# Patient Record
Sex: FEMALE | Race: WHITE | NOT HISPANIC OR LATINO | URBAN - METROPOLITAN AREA
[De-identification: names, ages, dates, MRNs, and addresses within clinical notes are randomized per-mention and may not be internally consistent; named-entity substitution may affect disease eponyms.]

---

## 2022-10-30 ENCOUNTER — INPATIENT (INPATIENT)
Facility: HOSPITAL | Age: 22
LOS: 0 days | Discharge: HOME | DRG: 916 | End: 2022-10-31
Attending: STUDENT IN AN ORGANIZED HEALTH CARE EDUCATION/TRAINING PROGRAM | Admitting: STUDENT IN AN ORGANIZED HEALTH CARE EDUCATION/TRAINING PROGRAM
Payer: COMMERCIAL

## 2022-10-30 VITALS
DIASTOLIC BLOOD PRESSURE: 62 MMHG | TEMPERATURE: 99 F | SYSTOLIC BLOOD PRESSURE: 126 MMHG | RESPIRATION RATE: 18 BRPM | OXYGEN SATURATION: 90 % | HEART RATE: 148 BPM

## 2022-10-30 DIAGNOSIS — F17.200 NICOTINE DEPENDENCE, UNSPECIFIED, UNCOMPLICATED: ICD-10-CM

## 2022-10-30 DIAGNOSIS — F17.290 NICOTINE DEPENDENCE, OTHER TOBACCO PRODUCT, UNCOMPLICATED: ICD-10-CM

## 2022-10-30 DIAGNOSIS — Z90.89 ACQUIRED ABSENCE OF OTHER ORGANS: Chronic | ICD-10-CM

## 2022-10-30 DIAGNOSIS — T78.2XXA ANAPHYLACTIC SHOCK, UNSPECIFIED, INITIAL ENCOUNTER: ICD-10-CM

## 2022-10-30 LAB
ALBUMIN SERPL ELPH-MCNC: 4.1 G/DL — SIGNIFICANT CHANGE UP (ref 3.4–5)
ALP SERPL-CCNC: 81 U/L — SIGNIFICANT CHANGE UP (ref 40–120)
ALT FLD-CCNC: 20 U/L — SIGNIFICANT CHANGE UP (ref 12–42)
ANION GAP SERPL CALC-SCNC: 8 MMOL/L — LOW (ref 9–16)
APTT BLD: 28 SEC — SIGNIFICANT CHANGE UP (ref 27.5–35.5)
AST SERPL-CCNC: 16 U/L — SIGNIFICANT CHANGE UP (ref 15–37)
BASOPHILS # BLD AUTO: 0.01 K/UL — SIGNIFICANT CHANGE UP (ref 0–0.2)
BASOPHILS NFR BLD AUTO: 0.1 % — SIGNIFICANT CHANGE UP (ref 0–2)
BILIRUB SERPL-MCNC: 0.7 MG/DL — SIGNIFICANT CHANGE UP (ref 0.2–1.2)
BUN SERPL-MCNC: 16 MG/DL — SIGNIFICANT CHANGE UP (ref 7–23)
CALCIUM SERPL-MCNC: 8.4 MG/DL — LOW (ref 8.5–10.5)
CHLORIDE SERPL-SCNC: 106 MMOL/L — SIGNIFICANT CHANGE UP (ref 96–108)
CO2 SERPL-SCNC: 23 MMOL/L — SIGNIFICANT CHANGE UP (ref 22–31)
CREAT SERPL-MCNC: 0.79 MG/DL — SIGNIFICANT CHANGE UP (ref 0.5–1.3)
EGFR: 110 ML/MIN/1.73M2 — SIGNIFICANT CHANGE UP
EOSINOPHIL # BLD AUTO: 0.11 K/UL — SIGNIFICANT CHANGE UP (ref 0–0.5)
EOSINOPHIL NFR BLD AUTO: 1 % — SIGNIFICANT CHANGE UP (ref 0–6)
GLUCOSE SERPL-MCNC: 205 MG/DL — HIGH (ref 70–99)
HCG SERPL-ACNC: <1 MIU/ML — SIGNIFICANT CHANGE UP
HCT VFR BLD CALC: 38.6 % — SIGNIFICANT CHANGE UP (ref 34.5–45)
HGB BLD-MCNC: 13.9 G/DL — SIGNIFICANT CHANGE UP (ref 11.5–15.5)
HIV 1+2 AB+HIV1 P24 AG SERPL QL IA: SIGNIFICANT CHANGE UP
IMM GRANULOCYTES NFR BLD AUTO: 0.4 % — SIGNIFICANT CHANGE UP (ref 0–0.9)
INR BLD: 1.32 — HIGH (ref 0.88–1.16)
LYMPHOCYTES # BLD AUTO: 44.7 % — HIGH (ref 13–44)
LYMPHOCYTES # BLD AUTO: 5.16 K/UL — HIGH (ref 1–3.3)
MANUAL SMEAR VERIFICATION: SIGNIFICANT CHANGE UP
MCHC RBC-ENTMCNC: 33.1 PG — SIGNIFICANT CHANGE UP (ref 27–34)
MCHC RBC-ENTMCNC: 36 GM/DL — SIGNIFICANT CHANGE UP (ref 32–36)
MCV RBC AUTO: 91.9 FL — SIGNIFICANT CHANGE UP (ref 80–100)
MONOCYTES # BLD AUTO: 0.89 K/UL — SIGNIFICANT CHANGE UP (ref 0–0.9)
MONOCYTES NFR BLD AUTO: 7.7 % — SIGNIFICANT CHANGE UP (ref 2–14)
NEUTROPHILS # BLD AUTO: 5.33 K/UL — SIGNIFICANT CHANGE UP (ref 1.8–7.4)
NEUTROPHILS NFR BLD AUTO: 46.1 % — SIGNIFICANT CHANGE UP (ref 43–77)
NRBC # BLD: 0 /100 WBCS — SIGNIFICANT CHANGE UP (ref 0–0)
PLAT MORPH BLD: NORMAL — SIGNIFICANT CHANGE UP
PLATELET # BLD AUTO: 366 K/UL — SIGNIFICANT CHANGE UP (ref 150–400)
PLATELET COUNT - ESTIMATE: NORMAL — SIGNIFICANT CHANGE UP
POTASSIUM SERPL-MCNC: 3.3 MMOL/L — LOW (ref 3.5–5.3)
POTASSIUM SERPL-SCNC: 3.3 MMOL/L — LOW (ref 3.5–5.3)
PROT SERPL-MCNC: 6.8 G/DL — SIGNIFICANT CHANGE UP (ref 6.4–8.2)
PROTHROM AB SERPL-ACNC: 15.5 SEC — HIGH (ref 10.5–13.4)
RBC # BLD: 4.2 M/UL — SIGNIFICANT CHANGE UP (ref 3.8–5.2)
RBC # FLD: 12.1 % — SIGNIFICANT CHANGE UP (ref 10.3–14.5)
RBC BLD AUTO: NORMAL — SIGNIFICANT CHANGE UP
SARS-COV-2 RNA SPEC QL NAA+PROBE: SIGNIFICANT CHANGE UP
SODIUM SERPL-SCNC: 137 MMOL/L — SIGNIFICANT CHANGE UP (ref 132–145)
TROPONIN I, HIGH SENSITIVITY RESULT: 6.5 NG/L — SIGNIFICANT CHANGE UP
WBC # BLD: 11.55 K/UL — HIGH (ref 3.8–10.5)
WBC # FLD AUTO: 11.55 K/UL — HIGH (ref 3.8–10.5)

## 2022-10-30 PROCEDURE — 99291 CRITICAL CARE FIRST HOUR: CPT

## 2022-10-30 PROCEDURE — 93010 ELECTROCARDIOGRAM REPORT: CPT

## 2022-10-30 PROCEDURE — 99291 CRITICAL CARE FIRST HOUR: CPT | Mod: GC

## 2022-10-30 RX ORDER — EPINEPHRINE 11.25MG/ML
0.5 SOLUTION, NON-ORAL INHALATION ONCE
Refills: 0 | Status: COMPLETED | OUTPATIENT
Start: 2022-10-30 | End: 2022-10-30

## 2022-10-30 RX ORDER — POTASSIUM CHLORIDE 20 MEQ
40 PACKET (EA) ORAL ONCE
Refills: 0 | Status: DISCONTINUED | OUTPATIENT
Start: 2022-10-30 | End: 2022-10-30

## 2022-10-30 RX ORDER — EPINEPHRINE 0.3 MG/.3ML
0.3 INJECTION INTRAMUSCULAR; SUBCUTANEOUS ONCE
Refills: 0 | Status: COMPLETED | OUTPATIENT
Start: 2022-10-30 | End: 2022-10-30

## 2022-10-30 RX ORDER — CHLORHEXIDINE GLUCONATE 213 G/1000ML
1 SOLUTION TOPICAL
Refills: 0 | Status: DISCONTINUED | OUTPATIENT
Start: 2022-10-30 | End: 2022-10-31

## 2022-10-30 RX ORDER — FAMOTIDINE 10 MG/ML
20 INJECTION INTRAVENOUS ONCE
Refills: 0 | Status: COMPLETED | OUTPATIENT
Start: 2022-10-30 | End: 2022-10-30

## 2022-10-30 RX ORDER — SODIUM CHLORIDE 9 MG/ML
1000 INJECTION INTRAMUSCULAR; INTRAVENOUS; SUBCUTANEOUS ONCE
Refills: 0 | Status: COMPLETED | OUTPATIENT
Start: 2022-10-30 | End: 2022-10-30

## 2022-10-30 RX ORDER — NICOTINE POLACRILEX 2 MG
1 GUM BUCCAL DAILY
Refills: 0 | Status: DISCONTINUED | OUTPATIENT
Start: 2022-10-30 | End: 2022-10-31

## 2022-10-30 RX ORDER — IPRATROPIUM/ALBUTEROL SULFATE 18-103MCG
3 AEROSOL WITH ADAPTER (GRAM) INHALATION ONCE
Refills: 0 | Status: COMPLETED | OUTPATIENT
Start: 2022-10-30 | End: 2022-10-30

## 2022-10-30 RX ORDER — SODIUM CHLORIDE 9 MG/ML
1000 INJECTION, SOLUTION INTRAVENOUS
Refills: 0 | Status: DISCONTINUED | OUTPATIENT
Start: 2022-10-30 | End: 2022-10-31

## 2022-10-30 RX ORDER — POTASSIUM CHLORIDE 20 MEQ
40 PACKET (EA) ORAL ONCE
Refills: 0 | Status: COMPLETED | OUTPATIENT
Start: 2022-10-30 | End: 2022-10-31

## 2022-10-30 RX ORDER — POTASSIUM CHLORIDE 20 MEQ
40 PACKET (EA) ORAL ONCE
Refills: 0 | Status: COMPLETED | OUTPATIENT
Start: 2022-10-30 | End: 2022-10-30

## 2022-10-30 RX ORDER — ALBUTEROL 90 UG/1
2 AEROSOL, METERED ORAL EVERY 6 HOURS
Refills: 0 | Status: DISCONTINUED | OUTPATIENT
Start: 2022-10-30 | End: 2022-10-31

## 2022-10-30 RX ORDER — FAMOTIDINE 10 MG/ML
20 INJECTION INTRAVENOUS DAILY
Refills: 0 | Status: DISCONTINUED | OUTPATIENT
Start: 2022-10-30 | End: 2022-10-31

## 2022-10-30 RX ORDER — EPINEPHRINE 0.3 MG/.3ML
0.1 INJECTION INTRAMUSCULAR; SUBCUTANEOUS
Qty: 4 | Refills: 0 | Status: DISCONTINUED | OUTPATIENT
Start: 2022-10-30 | End: 2022-10-31

## 2022-10-30 RX ADMIN — SODIUM CHLORIDE 1000 MILLILITER(S): 9 INJECTION INTRAMUSCULAR; INTRAVENOUS; SUBCUTANEOUS at 16:14

## 2022-10-30 RX ADMIN — Medication 125 MILLIGRAM(S): at 16:12

## 2022-10-30 RX ADMIN — SODIUM CHLORIDE 75 MILLILITER(S): 9 INJECTION, SOLUTION INTRAVENOUS at 17:57

## 2022-10-30 RX ADMIN — EPINEPHRINE 21.4 MICROGRAM(S)/KG/MIN: 0.3 INJECTION INTRAMUSCULAR; SUBCUTANEOUS at 17:57

## 2022-10-30 RX ADMIN — Medication 0.5 MILLILITER(S): at 16:28

## 2022-10-30 RX ADMIN — FAMOTIDINE 20 MILLIGRAM(S): 10 INJECTION INTRAVENOUS at 16:13

## 2022-10-30 RX ADMIN — Medication 3 MILLILITER(S): at 16:14

## 2022-10-30 RX ADMIN — Medication 40 MILLIEQUIVALENT(S): at 21:55

## 2022-10-30 RX ADMIN — EPINEPHRINE 0.3 MILLIGRAM(S): 0.3 INJECTION INTRAMUSCULAR; SUBCUTANEOUS at 16:06

## 2022-10-30 NOTE — ED PROVIDER NOTE - PHYSICAL EXAMINATION
VITAL SIGNS: I have reviewed nursing notes and confirm.  CONSTITUTIONAL: Appears diffusely red.  Drowsy but arouses to voice and fully oriented.    SKIN: Diffuse urticaria.   HEAD: Normocephalic; atraumatic.  EYES: PERRL, EOM intact; conjunctiva and sclera clear.  ENT: +Edema to posterior pharynx/uvula.  Tolerating secretions.  Normal voice.  Able to fully open mouth.    NECK: Supple; non tender.  CARD: S1, S2 normal; no murmurs, gallops, or rubs. +Regular tachycardia.   RESP: Diffuse expiratory wheezing.    ABD: Normal bowel sounds; soft; non-distended; non-tender; no hepatosplenomegaly.  MSK: Normal ROM. No clubbing, cyanosis or edema.  NEURO: Alert, oriented. Grossly unremarkable.  PSYCH: Cooperative, appropriate.

## 2022-10-30 NOTE — ED ADULT TRIAGE NOTE - CHIEF COMPLAINT QUOTE
Pt BIBEMS for allergic reaction after eating a chocolate bar. Pt with known allergies to nuts. Pt states that she took her own epi pen about 1 hour ago. Pt gave a second .3 epi IM, 50mg of benadryl, and 12mg of dexamethasone and one combi vent by EMS. Pt still with trouble breathing. + redness Pt BIBEMS for allergic reaction after eating a chocolate bar. Pt with known allergies to nuts. Pt states that she took her own epi pen about 1 hour ago and then went to [facility]  [facility] pt received 0.3 epi IM, 50mg of Benadryl IV and 12mg of dexamethasone IV. Pt clinically upgraded in triage. Pt BIBEMS for allergic reaction after eating a chocolate bar. Pt with known allergies to nuts. Pt states that she took her own epi pen about 1 hour ago and then went to [facility].  [facility] pt received 0.3 epi IM, 50mg of Benadryl IV and 12mg of dexamethasone IV. Pt with difficulty breathing in triage. Pt clinically upgraded in triage.

## 2022-10-30 NOTE — ED PROVIDER NOTE - OBJECTIVE STATEMENT
Pt is a 21yo F with a h/o asthma and anaphylaxis to peanuts.  Pt reports eating a piece of chocolate around 3pm that she didn't know had peanuts in it.  She had immediate onset of hives, full body itching, wheezing, and throat irritation/closure.  +Nausea and she vomited x 1.  She administered her epi pen with good response.  About 20 minutes after that however her symptoms returned so she went to a nearby [facility].  [facility] called EMS upon her arrival for severe anaphylaxis and she was then given a second epi pen, 12mg of decadron, and 50mg of Benadryl IV.  IV fluid started.  She was brought here and pt reports feeling mildly better but sxs persist.

## 2022-10-30 NOTE — H&P ADULT - NSHPPHYSICALEXAM_GEN_ALL_CORE
General: NAD, laying in bed, speaking in full sentences  HEENT: head NC/AT, no conjunctival injection, EOMI, MMM  Neck: supple, no JVD  Cardio: Tachy, +S1/S2, no M/R/G  Resp: lungs CTAB, no cough/wheezes/rales/rhonchi  Abdo: soft, NT, ND, +bowel sounds x4  Extremities: WWP, no edema/cyanosis/clubbing   Vasc: 2+ radial and DP pulses b/l  Neuro: A&Ox4  Psych: speech non-pressured, thoughts goal-oriented  Skin: dry, intact, no visible jaundice or urticaria

## 2022-10-30 NOTE — ED PROVIDER NOTE - CRITICAL CARE ATTENDING CONTRIBUTION TO CARE
The patient was seen immediately upon arrival due to a high probability of imminent or life-threatening deterioration secondary to anaphylaxis, which required my direct attention, intervention, and personal management at the bedside. I have personally provided critical care time exclusive of time spent on separately billable procedures. Time includes review of laboratory data, radiology results, discussion with consultants, discussion with the patient's family, and monitoring for potential decompensation.

## 2022-10-30 NOTE — H&P ADULT - NSHPREVIEWOFSYSTEMS_GEN_ALL_CORE
Constitutional: +agitation   Neuro: Denies dizziness or headaches  CV: +palpitations. Denies chest pain or edema.   Pulm: Denies shortness of breath or wheezing   GI: Denies nausea, diarrhea or abd pain   : Denies dysuria   Skin: Denies pruritus, hives or lesions ROS  Constitutional: +agitation   Neuro: Denies dizziness or headaches  CV: +palpitations. Denies chest pain or edema.   Pulm: Denies shortness of breath or wheezing   GI: Denies nausea, diarrhea or abd pain   : Denies dysuria   Skin: Denies pruritus, hives or lesions

## 2022-10-30 NOTE — ED PROVIDER NOTE - CLINICAL SUMMARY MEDICAL DECISION MAKING FREE TEXT BOX
Severe anaphylaxis.  Diffuse hives, posterior pharyngeal edema, and wheezing.  Third epipen given here upon arrival.  IV x 2 placed.  Solu-medrol 125mg given to max out steroids.  Benadryl 50mg given to max out benadryl.  Pepcid 20mg given to augment benadryl.  Racemic epi and duonebs given continuously.  We will start an epi drip with anaphylaxis dosing to hopefully avoid intubation/surgical airway.      425pm - Patient appears to be improving slightly.  Less red.  Airway more open and able to fully visualize uvula - edematous but no longer kissing tonsils as before.  Wheezing resolved.  Pt reports she is feeling somewhat better.  Patient moved to resuscitation room and airway management set up in case she doesn't continue to improve. MICU consulted and accepted patient.     445pm - Pt continues to improve.  Critical care medics at bedside and sign out performed by MD.  Will transfer to [facility] MICU lights and sirens.  With patient's consent we spoke with patient's mother and informed her of care plan.

## 2022-10-30 NOTE — H&P ADULT - NSHPLABSRESULTS_GEN_ALL_CORE
.  LABS:                        13.9   11.55 )-----------( 366      ( 30 Oct 2022 16:54 )             38.6     10-30  137  |  106  |  16  ----------------------------<  205<H>  3.3<L>   |  23  |  0.79    Ca    8.4<L>      30 Oct 2022 16:54  TPro  6.8  /  Alb  4.1  /  TBili  0.7  /  DBili  x   /  AST  16  /  ALT  20  /  AlkPhos  81  10-30  PT/INR - ( 30 Oct 2022 16:54 )   PT: 15.5 sec;   INR: 1.32     PTT - ( 30 Oct 2022 16:54 )  PTT:28.0 sec              RADIOLOGY, EKG & ADDITIONAL TESTS: Reviewed.

## 2022-10-30 NOTE — ED ADULT NURSE NOTE - OBJECTIVE STATEMENT
Patient presents with anaphylactic reaction to a chocolate bar she ate, might be contaminated with nuts. Patient has allergy to nuts. Patient was seen in [facility] and sent Patient presents with anaphylactic reaction to a chocolate bar she ate, might be contaminated with nuts which she is allergic to nuts. Patient was seen in [facility] and sent to ED immediately for respiratory distress. Pt was given epi 0.3 mg IM, 50 mg benadryl IV and 12 mg dexamethasone in [facility]. Patient clinically upgraded. Resuscitative efforts ongoing.

## 2022-10-30 NOTE — PATIENT PROFILE ADULT - FALL HARM RISK - HARM RISK INTERVENTIONS

## 2022-10-30 NOTE — H&P ADULT - ATTENDING COMMENTS
23 yo F w/ hx of nut allergy (previously has been able to tolerate hazelnuts) presented to [facility] w/ severe anaphylaxis and ?anaphylactic shock after eating hazelnut candy bar, received IM Epi x 3, decadrone, solumedrol, albuterol, pepcid, 2L NS due to severe respiratory distress and wheezing. Had rash initially, upon arrival to MICU this has resolved. States her voice feels slightly muffled and it hurts to swallow, but is not having difficulty handling her secretions and voice sounds clear. Arrived on epi gtt - per EMS, epi infusion started just before departure due to persistent symptoms. On exam now she is CTA B/L, no stridor, clear voice as noted, tachycardic and slightly tremulous, no rash, WWP, no hypotension. If remains stable on Epi gtt over next 30 min will slowly wean. Resume if symptoms recur. Received both high dose decadron and solumedrol, no further steroids for now. Keep NPO until off epi and she has remained asymptomatic. Known asthmatic, but well controlled and presentation not typical for asthma exacerbation. Does actively vape, no concern for VILI as CXR clear and no longer hypoxic. Admit to MICU.

## 2022-10-30 NOTE — H&P ADULT - NSICDXFAMILYHX_GEN_ALL_CORE_FT
FAMILY HISTORY:  Grandparent  Still living? No  Family hx of ALS (amyotrophic lateral sclerosis), Age at diagnosis: Age Unknown

## 2022-10-30 NOTE — H&P ADULT - HISTORY OF PRESENT ILLNESS
Ms. Cornejo is a 21 y/o female with Pmhx of asthma, anaphylaxis(to peanuts) and tonsillectomy. She presented to [facility] by EMS w wheezing, hives, full body rash, throat irritation, nausea, 1 episode of emesis and, shortness of breath s/p eating a chocolate bar around 230pm today as she did not know nuts were it. Patient gave herself 1 dose of epi IM x 1 and went to urgent care where 911 was called and gave patient albuterol neb, epi x 1, dexamethasone 12mg and Benadryl 50mg IV.     ED Course: afebrile, tachy 148, 126/62, RR 18 and hypoxic to 90% on RA   Patient p/w anaphylaxis for which she was given 3rd IM epi, solumedrol 125mg, albuterol, famotidine 20mg, NS 1 L and started on epi GTT and transferred to [facility] MICU for further management.  Ms. Cornejo is a 23 y/o female with Pmhx of asthma, anaphylaxis(to peanuts) and tonsillectomy. She presented to [facility] by EMS w wheezing, hives, full body rash, throat irritation, nausea, 1 episode of emesis and, shortness of breath s/p eating a chocolate bar around 230pm today as she did not know nuts were it. Patient gave herself 1 dose of epi IM x 1 and went to urgent care where 911 was called and gave patient albuterol neb, epi x 1, dexamethasone 12mg and Benadryl 50mg IV.     ED Course: afebrile, tachy 148, 126/62, RR 18 and hypoxic to 90% on RA   Patient p/w anaphylaxis for which she was given 3rd IM epi, solumedrol 125mg, albuterol, famotidine 20mg, NS 1 L and started on epi GTT and transferred to [facility] MICU for further management.

## 2022-10-30 NOTE — ED ADULT NURSE NOTE - CHIEF COMPLAINT QUOTE
Pt BIBEMS for allergic reaction after eating a chocolate bar. Pt with known allergies to nuts. Pt states that she took her own epi pen about 1 hour ago and then went [facility]  [facility] pt received 0.3 epi IM, 50mg of Benadryl IV and 12mg of dexamethasone IV. Pt with difficulty breathing in triage. Pt clinically upgraded in triage.

## 2022-10-30 NOTE — H&P ADULT - NSHPSOCIALHISTORY_GEN_ALL_CORE
- - -
Marital Status: Single  Lives: In [place] visiting   Sexual History: Has IUD sexually active w Male partners.

## 2022-10-30 NOTE — ED ADULT NURSE NOTE - NSIMPLEMENTINTERV_GEN_ALL_ED
Implemented All Fall Risk Interventions:  [place] to call system. Call bell, personal items and telephone within reach. Instruct patient to call for assistance. Room bathroom lighting operational. Non-slip footwear when patient is off stretcher. Physically safe environment: no spills, clutter or unnecessary equipment. Stretcher in lowest position, wheels locked, appropriate side rails in place. Provide visual cue, wrist band, yellow gown, etc. Monitor gait and stability. Monitor for mental status changes and reorient to person, place, and time. Review medications for side effects contributing to fall risk. Reinforce activity limits and safety measures with patient and family.

## 2022-10-31 ENCOUNTER — TRANSCRIPTION ENCOUNTER (OUTPATIENT)
Age: 22
End: 2022-10-31

## 2022-10-31 VITALS
OXYGEN SATURATION: 99 % | DIASTOLIC BLOOD PRESSURE: 78 MMHG | SYSTOLIC BLOOD PRESSURE: 143 MMHG | RESPIRATION RATE: 18 BRPM | HEART RATE: 104 BPM

## 2022-10-31 PROBLEM — Z00.00 ENCOUNTER FOR PREVENTIVE HEALTH EXAMINATION: Status: ACTIVE | Noted: 2022-10-31

## 2022-10-31 LAB
ANION GAP SERPL CALC-SCNC: 7 MMOL/L — SIGNIFICANT CHANGE UP (ref 5–17)
BUN SERPL-MCNC: 11 MG/DL — SIGNIFICANT CHANGE UP (ref 7–23)
CALCIUM SERPL-MCNC: 9 MG/DL — SIGNIFICANT CHANGE UP (ref 8.4–10.5)
CHLORIDE SERPL-SCNC: 106 MMOL/L — SIGNIFICANT CHANGE UP (ref 96–108)
CO2 SERPL-SCNC: 22 MMOL/L — SIGNIFICANT CHANGE UP (ref 22–31)
CREAT SERPL-MCNC: 0.62 MG/DL — SIGNIFICANT CHANGE UP (ref 0.5–1.3)
EGFR: 131 ML/MIN/1.73M2 — SIGNIFICANT CHANGE UP
GLUCOSE SERPL-MCNC: 141 MG/DL — HIGH (ref 70–99)
HCT VFR BLD CALC: 33.2 % — LOW (ref 34.5–45)
HGB BLD-MCNC: 11.7 G/DL — SIGNIFICANT CHANGE UP (ref 11.5–15.5)
MAGNESIUM SERPL-MCNC: 1.9 MG/DL — SIGNIFICANT CHANGE UP (ref 1.6–2.6)
MCHC RBC-ENTMCNC: 32.2 PG — SIGNIFICANT CHANGE UP (ref 27–34)
MCHC RBC-ENTMCNC: 35.2 GM/DL — SIGNIFICANT CHANGE UP (ref 32–36)
MCV RBC AUTO: 91.5 FL — SIGNIFICANT CHANGE UP (ref 80–100)
NRBC # BLD: 0 /100 WBCS — SIGNIFICANT CHANGE UP (ref 0–0)
PHOSPHATE SERPL-MCNC: 2.1 MG/DL — LOW (ref 2.5–4.5)
PLATELET # BLD AUTO: 273 K/UL — SIGNIFICANT CHANGE UP (ref 150–400)
POTASSIUM SERPL-MCNC: 4.3 MMOL/L — SIGNIFICANT CHANGE UP (ref 3.5–5.3)
POTASSIUM SERPL-SCNC: 4.3 MMOL/L — SIGNIFICANT CHANGE UP (ref 3.5–5.3)
RBC # BLD: 3.63 M/UL — LOW (ref 3.8–5.2)
RBC # FLD: 12.4 % — SIGNIFICANT CHANGE UP (ref 10.3–14.5)
SODIUM SERPL-SCNC: 135 MMOL/L — SIGNIFICANT CHANGE UP (ref 135–145)
WBC # BLD: 12.64 K/UL — HIGH (ref 3.8–10.5)
WBC # FLD AUTO: 12.64 K/UL — HIGH (ref 3.8–10.5)

## 2022-10-31 PROCEDURE — 96372 THER/PROPH/DIAG INJ SC/IM: CPT | Mod: XU

## 2022-10-31 PROCEDURE — 84100 ASSAY OF PHOSPHORUS: CPT

## 2022-10-31 PROCEDURE — 85025 COMPLETE CBC W/AUTO DIFF WBC: CPT

## 2022-10-31 PROCEDURE — 99291 CRITICAL CARE FIRST HOUR: CPT

## 2022-10-31 PROCEDURE — 87389 HIV-1 AG W/HIV-1&-2 AB AG IA: CPT

## 2022-10-31 PROCEDURE — 36415 COLL VENOUS BLD VENIPUNCTURE: CPT

## 2022-10-31 PROCEDURE — 99239 HOSP IP/OBS DSCHRG MGMT >30: CPT | Mod: GC

## 2022-10-31 PROCEDURE — 85027 COMPLETE CBC AUTOMATED: CPT

## 2022-10-31 PROCEDURE — U0003: CPT

## 2022-10-31 PROCEDURE — U0005: CPT

## 2022-10-31 PROCEDURE — 84702 CHORIONIC GONADOTROPIN TEST: CPT

## 2022-10-31 PROCEDURE — 85730 THROMBOPLASTIN TIME PARTIAL: CPT

## 2022-10-31 PROCEDURE — 83735 ASSAY OF MAGNESIUM: CPT

## 2022-10-31 PROCEDURE — 96374 THER/PROPH/DIAG INJ IV PUSH: CPT

## 2022-10-31 PROCEDURE — 80053 COMPREHEN METABOLIC PANEL: CPT

## 2022-10-31 PROCEDURE — 80048 BASIC METABOLIC PNL TOTAL CA: CPT

## 2022-10-31 PROCEDURE — 96375 TX/PRO/DX INJ NEW DRUG ADDON: CPT

## 2022-10-31 PROCEDURE — 85610 PROTHROMBIN TIME: CPT

## 2022-10-31 PROCEDURE — 94640 AIRWAY INHALATION TREATMENT: CPT

## 2022-10-31 RX ORDER — ALBUTEROL 90 UG/1
2 AEROSOL, METERED ORAL
Qty: 0 | Refills: 0 | DISCHARGE

## 2022-10-31 RX ORDER — FLUTICASONE PROPIONATE AND SALMETEROL 50; 250 UG/1; UG/1
0 POWDER ORAL; RESPIRATORY (INHALATION)
Qty: 0 | Refills: 0 | DISCHARGE

## 2022-10-31 RX ORDER — EPINEPHRINE 0.3 MG/.3ML
0.3 INJECTION INTRAMUSCULAR; SUBCUTANEOUS
Qty: 2 | Refills: 0
Start: 2022-10-31

## 2022-10-31 RX ORDER — EPINEPHRINE 0.3 MG/.3ML
1 INJECTION INTRAMUSCULAR; SUBCUTANEOUS
Qty: 2 | Refills: 0
Start: 2022-10-31

## 2022-10-31 RX ORDER — MAGNESIUM SULFATE 500 MG/ML
1 VIAL (ML) INJECTION ONCE
Refills: 0 | Status: COMPLETED | OUTPATIENT
Start: 2022-10-31 | End: 2022-10-31

## 2022-10-31 RX ORDER — EPINEPHRINE 0.3 MG/.3ML
0 INJECTION INTRAMUSCULAR; SUBCUTANEOUS
Qty: 0 | Refills: 0 | DISCHARGE

## 2022-10-31 RX ADMIN — Medication 62.5 MILLIMOLE(S): at 08:36

## 2022-10-31 RX ADMIN — Medication 40 MILLIEQUIVALENT(S): at 00:12

## 2022-10-31 RX ADMIN — CHLORHEXIDINE GLUCONATE 1 APPLICATION(S): 213 SOLUTION TOPICAL at 05:30

## 2022-10-31 RX ADMIN — CHLORHEXIDINE GLUCONATE 1 APPLICATION(S): 213 SOLUTION TOPICAL at 05:29

## 2022-10-31 RX ADMIN — SODIUM CHLORIDE 75 MILLILITER(S): 9 INJECTION, SOLUTION INTRAVENOUS at 06:08

## 2022-10-31 RX ADMIN — Medication 100 GRAM(S): at 07:34

## 2022-10-31 NOTE — DISCHARGE NOTE PROVIDER - NSDCMRMEDTOKEN_GEN_ALL_CORE_FT
Advair Diskus:   albuterol 2.5 mg/3 mL (0.083%) inhalation solution: 2 puff(s) inhaled 4 to 6 times a day, As Needed   Advair Diskus:   albuterol 2.5 mg/3 mL (0.083%) inhalation solution: 2 puff(s) inhaled 4 to 6 times a day, As Needed  Epi EZ Pen 0.3 mg injectable kit:

## 2022-10-31 NOTE — DISCHARGE NOTE PROVIDER - CARE PROVIDER_API CALL
Martín Soto)  Internal Medicine  [address] [place] [address]  Phone: [phone]  Fax: [phone]  Follow Up Time:

## 2022-10-31 NOTE — DISCHARGE NOTE PROVIDER - NSDCCPCAREPLAN_GEN_ALL_CORE_FT
PRINCIPAL DISCHARGE DIAGNOSIS  Diagnosis: Acute anaphylaxis  Assessment and Plan of Treatment: You came to the hospital after a severe allergic reaction.       PRINCIPAL DISCHARGE DIAGNOSIS  Diagnosis: Acute anaphylaxis  Assessment and Plan of Treatment: You came to the hospital after a severe allergic reaction. You were given a medication called epinephrine. This is the same medication in your epipen. However, the dosages we gave you were much higher and given over a much longer duration. We also gave you steroids and medications called Famotidine and Benadryl to reduce the rash and decrease your allergy symptoms.   Though you were not intubated for this allergic reaction, you were admitted to the ICU and monitored very closely. This type of allergic reaction is very dangerous and can lead to death.   We are prescribing you an EpiPen in the event you undergo a similar reaction. If you accidentally eat any nuts, you should use your EpiPen and RETURN IMMEDIATELY TO THE CLOSEST ER OR CALL 9-1-1.   If you are eating food that you have not prepared yourself and you feel your throat swelling or have trouble breathing, CALL 9-1-1 IMMEDIATELY.  You should follow up with your primary care physician as soon as possible, preferably within 7-10 days after you are discharged.

## 2022-10-31 NOTE — DISCHARGE NOTE PROVIDER - NSDCFUADDAPPT_GEN_ALL_CORE_FT
Please follow up with your primary care physician within 7-10 days after you are discharged. We are providing information for the clinic affiliated with [facility] should you need to follow up here.

## 2022-10-31 NOTE — DISCHARGE NOTE NURSING/CASE MANAGEMENT/SOCIAL WORK - NSDCPEFALRISK_GEN_ALL_CORE
For information on Fall & Injury Prevention, visit: [email or web address] OR  [email or web address] OR  https://www.cdc.gov/steadi/patient.html

## 2022-10-31 NOTE — DISCHARGE NOTE NURSING/CASE MANAGEMENT/SOCIAL WORK - PATIENT PORTAL LINK FT
You can access the FollowMyHealth Patient Portal offered by [facility] by registering at the following website: [email or web address]. By joining [facility]’s FollowMyHealth portal, you will also be able to view your health information using other applications (apps) compatible with our system.

## 2022-10-31 NOTE — DISCHARGE NOTE PROVIDER - HOSPITAL COURSE
Patient is a 21 y/o female with Pmhx of asthma, anaphylaxis(to peanuts) and tonsillectomy. She presented to [facility] by EMS w wheezing, hives, full body rash, throat irritation, nausea, 1 episode of emesis and, shortness of breath s/p eating a chocolate bar around 230pm today as she did not know nuts were it. Patient gave herself 1 dose of epi IM x 1 and went to urgent care where 911 was called and gave patient albuterol neb, epi x 1, dexamethasone 12mg and Benadryl 50mg IV.  Started on epinephrine gtt and sent to MICU for evaluation. Patient Reports improvement of symptoms on arrival. No wheezing, no SOB, rash is cleared. Complains of some anxiety and tremulousness s/p epinephrine. She was weaned off epinephrine drip. On RA overnight, satting well. No new SOB or hives. She denies any symptoms, no SOB, chest pain, abdominal pain, N/V/D. Able to tolerate PO.       Problem List/Main Diagnoses:     Neuro: CARIDAD    CV:   #Anaphylaxis RESOLVED  - Weaned off epinephrine gtt   - given dexamethasone 12mg and solumedrol 125mg   - given Pepcid/Benadryl   - NS x 2L       Pulm: No active issue   # h/o Asthma  - c/w Albuterol neb q6h PRN as needed    #Active vape smoker  - Provide smoking cessation education   - Nicotine patch     GI: CARIDAD   - Able to tolerate regular diet.    Renal/: CARIDAD      Discharge plan: discharge to home      Exam on discharge:    Physical Exam: General: NAD, laying in bed, speaking in full sentences  HEENT: head NC/AT, no conjunctival injection, EOMI, MMM  Neck: supple, no JVD  Cardio: Tachy, +S1/S2, no M/R/G  Resp: lungs CTAB, no cough/wheezes/rales/rhonchi  Abdo: soft, NT, ND, +bowel sounds x4  Extremities: WWP, no edema/cyanosis/clubbing   Vasc: 2+ radial and DP pulses b/l  Neuro: A&Ox4  Psych: speech non-pressured, thoughts goal-oriented  Skin: dry, intact, no visible jaundice or urticaria     Patient is a 23 y/o female with Pmhx of asthma, anaphylaxis(to peanuts) and tonsillectomy. She presented to [facility] by EMS w wheezing, hives, full body rash, throat irritation, nausea, 1 episode of emesis and, shortness of breath s/p eating a chocolate bar around 230pm today as she did not know nuts were it. Patient gave herself 1 dose of epi IM x 1 and went to urgent care where 911 was called and gave patient albuterol neb, epi x 1, dexamethasone 12mg and Benadryl 50mg IV.  Started on epinephrine gtt and sent to MICU for evaluation. Patient Reports improvement of symptoms on arrival. No wheezing, no SOB, rash is cleared. Complains of some anxiety and tremulousness s/p epinephrine. She was weaned off epinephrine drip. On RA overnight, satting well. No new SOB or hives. She denies any symptoms, no SOB, chest pain, abdominal pain, N/V/D. Able to tolerate PO.       Problem List/Main Diagnoses:     Neuro: CARIDAD    CV:   #Anaphylaxis RESOLVED  - Weaned off epinephrine gtt   - given dexamethasone 12mg and solumedrol 125mg   - given Pepcid/Benadryl   - NS x 2L   - Rx Epipen      Pulm: No active issue   # h/o Asthma  - c/w Albuterol neb q6h PRN as needed    #Active vape smoker  - Provide smoking cessation education   - Nicotine patch     GI: CARIDAD   - Able to tolerate regular diet.    Renal/: CARIDAD      Discharge plan: discharge to home      Exam on discharge:    Physical Exam: General: NAD, laying in bed, speaking in full sentences  HEENT: head NC/AT, no conjunctival injection, EOMI, MMM  Neck: supple, no JVD  Cardio: Tachy, +S1/S2, no M/R/G  Resp: lungs CTAB, no cough/wheezes/rales/rhonchi  Abdo: soft, NT, ND, +bowel sounds x4  Extremities: WWP, no edema/cyanosis/clubbing   Vasc: 2+ radial and DP pulses b/l  Neuro: A&Ox4  Psych: speech non-pressured, thoughts goal-oriented  Skin: dry, intact, no visible jaundice or urticaria

## 2022-10-31 NOTE — DISCHARGE NOTE NURSING/CASE MANAGEMENT/SOCIAL WORK - NSDCFUADDAPPT_GEN_ALL_CORE_FT
Please follow up with your primary care physician within 7-10 days after you are discharged. We are providing information for the clinic affiliated with [facility] should you need to follow up here.

## 2022-11-06 DIAGNOSIS — T78.01XA ANAPHYLACTIC REACTION DUE TO PEANUTS, INITIAL ENCOUNTER: ICD-10-CM

## 2022-11-06 DIAGNOSIS — F17.290 NICOTINE DEPENDENCE, OTHER TOBACCO PRODUCT, UNCOMPLICATED: ICD-10-CM

## 2022-11-06 DIAGNOSIS — J45.909 UNSPECIFIED ASTHMA, UNCOMPLICATED: ICD-10-CM

## 2022-11-06 DIAGNOSIS — Z91.010 ALLERGY TO PEANUTS: ICD-10-CM

## 2022-11-06 DIAGNOSIS — R00.0 TACHYCARDIA, UNSPECIFIED: ICD-10-CM
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
## 2024-03-25 NOTE — ED ADULT NURSE NOTE - NSFALLRSKOUTCOME_ED_ALL_ED
Patient is calling regarding cancelling an appointment.    Date/Time: 3/28/2024 8:15am    Reason: [place] of [place]    Patient was rescheduled: YES [] NO [x]  If yes, when was Patient reschedule for:     Patient requesting call back to reschedule: YES [] NO [x]   Fall Risk